# Patient Record
Sex: MALE | Race: WHITE | NOT HISPANIC OR LATINO | URBAN - METROPOLITAN AREA
[De-identification: names, ages, dates, MRNs, and addresses within clinical notes are randomized per-mention and may not be internally consistent; named-entity substitution may affect disease eponyms.]

---

## 2024-01-04 ENCOUNTER — OFFICE VISIT (OUTPATIENT)
Dept: OPHTHALMOLOGY | Facility: CLINIC | Age: 59
End: 2024-01-04
Payer: COMMERCIAL

## 2024-01-04 DIAGNOSIS — H40.1313 PIGMENTARY GLAUCOMA, RIGHT EYE, SEVERE STAGE: Primary | ICD-10-CM

## 2024-01-04 DIAGNOSIS — H40.1321 PIGMENTARY GLAUCOMA, LEFT EYE, MILD STAGE: ICD-10-CM

## 2024-01-04 DIAGNOSIS — H25.041 POSTERIOR SUBCAPSULAR POLAR SENILE CATARACT, RIGHT: ICD-10-CM

## 2024-01-04 DIAGNOSIS — H35.413 LATTICE DEGENERATION OF BOTH RETINAS: ICD-10-CM

## 2024-01-04 PROCEDURE — 92083 EXTENDED VISUAL FIELD XM: CPT | Performed by: OPHTHALMOLOGY

## 2024-01-04 PROCEDURE — 99213 OFFICE O/P EST LOW 20 MIN: CPT | Performed by: OPHTHALMOLOGY

## 2024-01-04 RX ORDER — VALACYCLOVIR HYDROCHLORIDE 1 G/1
1000 TABLET, FILM COATED ORAL 2 TIMES DAILY
COMMUNITY
Start: 2022-12-28

## 2024-01-04 RX ORDER — ACYCLOVIR 400 MG/1
TABLET ORAL
COMMUNITY
Start: 2015-12-19

## 2024-01-04 RX ORDER — BRIMONIDINE TARTRATE AND TIMOLOL MALEATE 2; 5 MG/ML; MG/ML
SOLUTION OPHTHALMIC EVERY 12 HOURS
COMMUNITY

## 2024-01-04 RX ORDER — FINASTERIDE 5 MG/1
5 TABLET, FILM COATED ORAL
COMMUNITY
Start: 2022-12-12

## 2024-01-04 RX ORDER — LATANOPROST 50 UG/ML
1 SOLUTION/ DROPS OPHTHALMIC
COMMUNITY
Start: 2016-04-21

## 2024-01-04 RX ORDER — ATORVASTATIN CALCIUM 40 MG/1
TABLET, FILM COATED ORAL
COMMUNITY
Start: 2018-02-24

## 2024-01-04 RX ORDER — SEMAGLUTIDE 1.7 MG/.75ML
INJECTION, SOLUTION SUBCUTANEOUS
COMMUNITY

## 2024-01-04 RX ORDER — GABAPENTIN 600 MG/1
TABLET ORAL
COMMUNITY
Start: 2018-02-24

## 2024-01-04 RX ORDER — LISINOPRIL 40 MG/1
40 TABLET ORAL
COMMUNITY
Start: 2023-06-12

## 2024-01-04 RX ORDER — TADALAFIL 20 MG/1
20 TABLET ORAL
COMMUNITY
Start: 2023-06-12

## 2024-01-04 RX ORDER — AMLODIPINE BESYLATE 10 MG/1
10 TABLET ORAL
COMMUNITY
Start: 2023-06-12

## 2024-01-04 RX ORDER — TIZANIDINE 4 MG/1
4 TABLET ORAL EVERY 8 HOURS PRN
COMMUNITY
Start: 2017-03-20

## 2024-01-04 RX ORDER — CARVEDILOL 25 MG/1
1 TABLET ORAL 2 TIMES DAILY
COMMUNITY
Start: 2021-08-27

## 2024-01-04 RX ORDER — ROSUVASTATIN CALCIUM 40 MG/1
1 TABLET, COATED ORAL NIGHTLY
COMMUNITY
Start: 2021-08-27

## 2024-01-04 RX ORDER — DISULFIRAM 500 MG/1
500 TABLET ORAL
COMMUNITY
Start: 2016-12-28

## 2024-01-04 RX ORDER — DULOXETIN HYDROCHLORIDE 30 MG/1
CAPSULE, DELAYED RELEASE ORAL
COMMUNITY
Start: 2016-09-22

## 2024-01-04 ASSESSMENT — PACHYMETRY
OS_CT(UM): 528
OD_CT(UM): 578

## 2024-01-04 ASSESSMENT — CONF VISUAL FIELD
OS_INFERIOR_NASAL_RESTRICTION: 0
OS_NORMAL: 1
METHOD: COUNTING FINGERS
OS_SUPERIOR_NASAL_RESTRICTION: 0
OS_SUPERIOR_TEMPORAL_RESTRICTION: 0
OS_INFERIOR_TEMPORAL_RESTRICTION: 0

## 2024-01-04 ASSESSMENT — EXTERNAL EXAM - RIGHT EYE: OD_EXAM: NORMAL

## 2024-01-04 ASSESSMENT — SLIT LAMP EXAM - LIDS
COMMENTS: GOOD POSITION
COMMENTS: GOOD POSITION

## 2024-01-04 ASSESSMENT — VISUAL ACUITY
OS_CC+: -1
OD_CC+: -1
OS_CC: 20/20
METHOD: SNELLEN - LINEAR
OD_CC: 20/25
CORRECTION_TYPE: CONTACTS

## 2024-01-04 ASSESSMENT — ENCOUNTER SYMPTOMS
EYES NEGATIVE: 1
NEUROLOGICAL NEGATIVE: 0
CONSTITUTIONAL NEGATIVE: 0

## 2024-01-04 ASSESSMENT — EXTERNAL EXAM - LEFT EYE: OS_EXAM: NORMAL

## 2024-01-04 ASSESSMENT — TONOMETRY
OD_IOP_MMHG: 12
IOP_METHOD: GOLDMANN APPLANATION
OS_IOP_MMHG: 9

## 2024-01-04 ASSESSMENT — CUP TO DISC RATIO
OD_RATIO: 0.95
OS_RATIO: .5

## 2024-01-04 NOTE — PROGRESS NOTES
Visual Acuity (Snellen - Linear)         Right Left    Dist cc 20/25 -1 20/20 -1      Correction: Contacts          Tonometry       Tonometry (Goldmann Applanation, 8:53 AM)         Right Left    Pressure 12 9                  Assessment/Plan   Last dilated:  6/22/23   RNFL 3/16/23 OS:  sup and inf loss; progression from 2016 ot 2021, but stable from 2021  1.  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 Pt has switched glaucoma medications that is covered by his insurance. s/p SLT OD 6/28/16 pre-laser IOP 15 mmHg -> + effect.  s/p repeat SLT OD 4/20/17   (pre-laser IOP 18 mmHg) -> + effect.         There may be some progression of RNFL OS but that could have been vitreous traction artifact.  VF is technically same as 2016, but not as good as previous.  Course unclear - given that IOP is already very low, will not advance Rx, but will increase surveillance.  VF Q6 months       Plan: cont latanoprost OU QHS              cont dorzolamide / timolol OU BID             cont brimonidine 0.2% OU BID             f/u 3 month              t/c repeat Q6 month VF (June 2024)      2.   H/o LASIK OS: pt`s [facility] is off.       Plan:  monitor     3.  Cataracts OU:  minimally visually significant.  Polar cataract right eye       Plan:  monitor    4.  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

## 2024-04-04 ENCOUNTER — APPOINTMENT (OUTPATIENT)
Dept: OPHTHALMOLOGY | Facility: CLINIC | Age: 59
End: 2024-04-04
Payer: COMMERCIAL

## 2024-04-11 ENCOUNTER — APPOINTMENT (OUTPATIENT)
Dept: OPHTHALMOLOGY | Facility: CLINIC | Age: 59
End: 2024-04-11
Payer: COMMERCIAL

## 2024-04-25 ENCOUNTER — OFFICE VISIT (OUTPATIENT)
Dept: OPHTHALMOLOGY | Facility: CLINIC | Age: 59
End: 2024-04-25
Payer: COMMERCIAL

## 2024-04-25 DIAGNOSIS — H40.1313 PIGMENTARY GLAUCOMA, RIGHT EYE, SEVERE STAGE: Primary | ICD-10-CM

## 2024-04-25 DIAGNOSIS — H40.1321 PIGMENTARY GLAUCOMA, LEFT EYE, MILD STAGE: ICD-10-CM

## 2024-04-25 PROCEDURE — 99213 OFFICE O/P EST LOW 20 MIN: CPT | Performed by: OPHTHALMOLOGY

## 2024-04-25 ASSESSMENT — SLIT LAMP EXAM - LIDS
COMMENTS: GOOD POSITION
COMMENTS: GOOD POSITION

## 2024-04-25 ASSESSMENT — PACHYMETRY
OS_CT(UM): 528
OD_CT(UM): 578

## 2024-04-25 ASSESSMENT — ENCOUNTER SYMPTOMS
GASTROINTESTINAL NEGATIVE: 0
ENDOCRINE NEGATIVE: 0
CONSTITUTIONAL NEGATIVE: 0
HEMATOLOGIC/LYMPHATIC NEGATIVE: 0
ALLERGIC/IMMUNOLOGIC NEGATIVE: 0
EYES NEGATIVE: 0
CARDIOVASCULAR NEGATIVE: 0
MUSCULOSKELETAL NEGATIVE: 0
PSYCHIATRIC NEGATIVE: 0
RESPIRATORY NEGATIVE: 0
NEUROLOGICAL NEGATIVE: 0

## 2024-04-25 ASSESSMENT — VISUAL ACUITY
OS_CC: 20/30
CORRECTION_TYPE: CONTACTS
OD_PH_CC: 20/20
OS_CC+: -1
METHOD: SNELLEN - LINEAR
OD_CC+: -1
OD_PH_CC+: -2
OD_CC: 20/30
OS_PH_CC: 20/25

## 2024-04-25 ASSESSMENT — CONF VISUAL FIELD: OD_SUPERIOR_NASAL_RESTRICTION: 1

## 2024-04-25 ASSESSMENT — TONOMETRY
OS_IOP_MMHG: 10
OD_IOP_MMHG: 12
IOP_METHOD: GOLDMANN APPLANATION

## 2024-04-25 ASSESSMENT — CUP TO DISC RATIO
OD_RATIO: 0.95
OS_RATIO: .6

## 2024-04-25 ASSESSMENT — EXTERNAL EXAM - RIGHT EYE: OD_EXAM: NORMAL

## 2024-04-25 ASSESSMENT — EXTERNAL EXAM - LEFT EYE: OS_EXAM: NORMAL

## 2024-04-25 NOTE — PROGRESS NOTES
Visual Acuity (Snellen - Linear)         Right Left    Dist cc 20/30 -1 20/30 -1    Dist ph cc 20/20 -2 20/25      Correction: Contacts          Tonometry       Tonometry (Goldmann Applanation, 8:33 AM)         Right Left    Pressure 12 10                  Assessment/Plan   Last dilated:  6/22/23   RNFL 3/16/23 OS:  sup and inf loss; progression from 2016 ot 2021, but stable from 2021  1.  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 Pt has switched glaucoma medications that is covered by his insurance. s/p SLT OD 6/28/16 pre-laser IOP 15 mmHg -> + effect.  s/p repeat SLT OD 4/20/17   (pre-laser IOP 18 mmHg) -> + effect.         There may be some progression of RNFL OS but that could have been vitreous traction artifact.  VF is technically same as 2016, but not as good as previous.  Course unclear - given that IOP is already very low, will not advance Rx, but will increase surveillance.  VF Q6 months       Plan: cont latanoprost OU QHS              cont dorzolamide / timolol OU BID             cont brimonidine 0.2% OU BID             f/u 2 month              t/c repeat Q6 month VF (June 2024)      2.   H/o LASIK OS: pt`s [facility] is off.       Plan:  monitor     3.  Cataracts OU:  minimally visually significant.  Polar cataract right eye       Plan:  monitor    4.  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

## 2024-06-25 ENCOUNTER — APPOINTMENT (OUTPATIENT)
Dept: OPHTHALMOLOGY | Facility: CLINIC | Age: 59
End: 2024-06-25
Payer: COMMERCIAL

## 2024-07-10 DIAGNOSIS — H40.1313 PIGMENTARY GLAUCOMA, RIGHT EYE, SEVERE STAGE: ICD-10-CM

## 2024-07-10 DIAGNOSIS — H40.1321 PIGMENTARY GLAUCOMA, LEFT EYE, MILD STAGE: ICD-10-CM

## 2024-07-10 RX ORDER — LATANOPROST 50 UG/ML
1 SOLUTION/ DROPS OPHTHALMIC NIGHTLY
Qty: 2.5 ML | Refills: 3 | Status: SHIPPED | OUTPATIENT
Start: 2024-07-10

## 2024-07-15 DIAGNOSIS — H40.1313 PIGMENTARY GLAUCOMA, RIGHT EYE, SEVERE STAGE: ICD-10-CM

## 2024-07-15 DIAGNOSIS — H40.1321 PIGMENTARY GLAUCOMA, LEFT EYE, MILD STAGE: ICD-10-CM

## 2024-07-15 RX ORDER — LATANOPROST 50 UG/ML
1 SOLUTION/ DROPS OPHTHALMIC NIGHTLY
Qty: 2.5 ML | Refills: 3 | Status: SHIPPED | OUTPATIENT
Start: 2024-07-15

## 2024-07-18 DIAGNOSIS — H40.1313 PIGMENTARY GLAUCOMA, RIGHT EYE, SEVERE STAGE: ICD-10-CM

## 2024-07-18 DIAGNOSIS — H40.1321 PIGMENTARY GLAUCOMA, LEFT EYE, MILD STAGE: ICD-10-CM

## 2024-07-18 RX ORDER — BRIMONIDINE TARTRATE AND TIMOLOL MALEATE 2; 5 MG/ML; MG/ML
1 SOLUTION OPHTHALMIC EVERY 12 HOURS
Qty: 7.5 ML | Refills: 3 | Status: SHIPPED | OUTPATIENT
Start: 2024-07-18 | End: 2024-08-17

## 2024-08-20 DIAGNOSIS — H40.1321 PIGMENTARY GLAUCOMA, LEFT EYE, MILD STAGE: ICD-10-CM

## 2024-08-20 DIAGNOSIS — H40.1313 PIGMENTARY GLAUCOMA, RIGHT EYE, SEVERE STAGE: ICD-10-CM

## 2024-08-20 RX ORDER — DORZOLAMIDE HYDROCHLORIDE AND TIMOLOL MALEATE 20; 5 MG/ML; MG/ML
1 SOLUTION/ DROPS OPHTHALMIC 2 TIMES DAILY
Qty: 30 ML | Refills: 3 | Status: SHIPPED | OUTPATIENT
Start: 2024-08-20 | End: 2025-08-20

## 2024-08-20 RX ORDER — BRIMONIDINE TARTRATE 2 MG/ML
1 SOLUTION/ DROPS OPHTHALMIC 2 TIMES DAILY
Qty: 30 ML | Refills: 3 | Status: SHIPPED | OUTPATIENT
Start: 2024-08-20 | End: 2025-08-20

## 2024-08-20 RX ORDER — LATANOPROST 50 UG/ML
1 SOLUTION/ DROPS OPHTHALMIC NIGHTLY
Qty: 2.5 ML | Refills: 3 | Status: SHIPPED | OUTPATIENT
Start: 2024-08-20

## 2024-08-22 ENCOUNTER — APPOINTMENT (OUTPATIENT)
Dept: OPHTHALMOLOGY | Facility: CLINIC | Age: 59
End: 2024-08-22
Payer: COMMERCIAL

## 2024-08-29 ENCOUNTER — APPOINTMENT (OUTPATIENT)
Dept: OPHTHALMOLOGY | Facility: CLINIC | Age: 59
End: 2024-08-29
Payer: COMMERCIAL

## 2024-08-29 DIAGNOSIS — H40.1321 PIGMENTARY GLAUCOMA, LEFT EYE, MILD STAGE: ICD-10-CM

## 2024-08-29 DIAGNOSIS — H25.041 POSTERIOR SUBCAPSULAR POLAR SENILE CATARACT, RIGHT: Primary | ICD-10-CM

## 2024-08-29 DIAGNOSIS — H40.1313 PIGMENTARY GLAUCOMA, RIGHT EYE, SEVERE STAGE: ICD-10-CM

## 2024-08-29 DIAGNOSIS — H35.413 LATTICE DEGENERATION OF BOTH RETINAS: ICD-10-CM

## 2024-08-29 PROCEDURE — 99214 OFFICE O/P EST MOD 30 MIN: CPT | Performed by: OPHTHALMOLOGY

## 2024-08-29 RX ORDER — DORZOLAMIDE HYDROCHLORIDE AND TIMOLOL MALEATE 20; 5 MG/ML; MG/ML
1 SOLUTION/ DROPS OPHTHALMIC 2 TIMES DAILY
Qty: 30 ML | Refills: 3 | Status: SHIPPED | OUTPATIENT
Start: 2024-08-29 | End: 2025-08-29

## 2024-08-29 RX ORDER — LATANOPROST 50 UG/ML
1 SOLUTION/ DROPS OPHTHALMIC NIGHTLY
Qty: 7.5 ML | Refills: 3 | Status: SHIPPED | OUTPATIENT
Start: 2024-08-29

## 2024-08-29 RX ORDER — BRIMONIDINE TARTRATE 2 MG/ML
1 SOLUTION/ DROPS OPHTHALMIC 2 TIMES DAILY
Qty: 30 ML | Refills: 3 | Status: SHIPPED | OUTPATIENT
Start: 2024-08-29 | End: 2025-08-29

## 2024-08-29 ASSESSMENT — CUP TO DISC RATIO
OD_RATIO: 0.95
OS_RATIO: .6

## 2024-08-29 ASSESSMENT — SLIT LAMP EXAM - LIDS
COMMENTS: GOOD POSITION
COMMENTS: GOOD POSITION

## 2024-08-29 ASSESSMENT — PACHYMETRY
OS_CT(UM): 528
OD_CT(UM): 578

## 2024-08-29 ASSESSMENT — CONF VISUAL FIELD
OS_SUPERIOR_TEMPORAL_RESTRICTION: 0
OD_SUPERIOR_NASAL_RESTRICTION: 1
OD_INFERIOR_NASAL_RESTRICTION: 1
OS_SUPERIOR_NASAL_RESTRICTION: 0
OS_INFERIOR_NASAL_RESTRICTION: 0
OD_SUPERIOR_TEMPORAL_RESTRICTION: 3

## 2024-08-29 ASSESSMENT — EXTERNAL EXAM - RIGHT EYE: OD_EXAM: NORMAL

## 2024-08-29 ASSESSMENT — TONOMETRY
OS_IOP_MMHG: 11
OD_IOP_MMHG: 18
IOP_METHOD: GOLDMANN APPLANATION

## 2024-08-29 ASSESSMENT — VISUAL ACUITY
OS_CC+: +2
METHOD: SNELLEN - LINEAR
OS_CC: 20/25
OD_CC: 20/20
OD_CC+: -2

## 2024-08-29 ASSESSMENT — EXTERNAL EXAM - LEFT EYE: OS_EXAM: NORMAL

## 2024-08-29 NOTE — PROGRESS NOTES
Visual Acuity (Snellen - Linear)         Right Left    Dist cc 20/20 -2 20/25 +2    Dist ph cc  NI          Tonometry       Tonometry (Goldmann Applanation, 8:30 AM)         Right Left    Pressure 18 11                  Assessment/Plan   Last dilated:  6/22/23  Last HVF:  1/4/24    RNFL 3/16/23 OS:  sup and inf loss; progression from 2016 ot 2021, but stable from 2021    1.  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 Pt has switched glaucoma medications that is covered by his insurance. s/p SLT OD 6/28/16 pre-laser IOP 15 mmHg -> + effect.  s/p repeat SLT OD 4/20/17 (pre-laser IOP 18 mmHg) -> + effect.         There may be some progression of RNFL OS but that could have been vitreous traction artifact.  VF is technically same as 2016, but not as good as previous.  Course unclear - given that IOP is already very low, will not advance Rx, but will increase surveillance.  VF Q6 months.        IOP is not controlled OD.  Discussed options 1) CPM, 2) rhopressa, 3) repeat/repeat SLT OD.  R/B/A        Plan: cont latanoprost OU QHS              cont dorzolamide / timolol OU BID             cont brimonidine 0.2% OU BID             pt wishes to pursue SLT OD (RIGHT)     2.   H/o LASIK OS: pt`s [facility] is off.       Plan:  monitor     3.  Cataracts OU:  minimally visually significant.  Polar cataract right eye       Plan:  monitor    4.  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

## 2024-09-27 ENCOUNTER — TELEPHONE (OUTPATIENT)
Dept: OPHTHALMOLOGY | Facility: CLINIC | Age: 59
End: 2024-09-27
Payer: COMMERCIAL

## 2024-09-27 NOTE — TELEPHONE ENCOUNTER
Patient is scheduled for pressure check with Dr. Angel on 10/1 @ 2:45pm  Patient does not want to schedule a Laser Procedure at this time only wants a pressure check.

## 2024-09-30 ENCOUNTER — PATIENT MESSAGE (OUTPATIENT)
Dept: OPHTHALMOLOGY | Facility: CLINIC | Age: 59
End: 2024-09-30
Payer: COMMERCIAL

## 2024-10-01 ENCOUNTER — APPOINTMENT (OUTPATIENT)
Dept: OPHTHALMOLOGY | Facility: CLINIC | Age: 59
End: 2024-10-01
Payer: COMMERCIAL

## 2024-10-01 DIAGNOSIS — H40.1313 PIGMENTARY GLAUCOMA, RIGHT EYE, SEVERE STAGE: ICD-10-CM

## 2024-10-01 DIAGNOSIS — H40.2234 BILATERAL CHRONIC PRIMARY ANGLE-CLOSURE GLAUCOMA, INDETERMINATE STAGE: Primary | ICD-10-CM

## 2024-10-01 DIAGNOSIS — H40.1321 PIGMENTARY GLAUCOMA, LEFT EYE, MILD STAGE: ICD-10-CM

## 2024-10-01 PROCEDURE — 99213 OFFICE O/P EST LOW 20 MIN: CPT | Performed by: OPHTHALMOLOGY

## 2024-10-01 RX ORDER — BRIMONIDINE TARTRATE 2 MG/ML
1 SOLUTION/ DROPS OPHTHALMIC 2 TIMES DAILY
Qty: 30 ML | Refills: 3 | Status: SHIPPED | OUTPATIENT
Start: 2024-10-01 | End: 2025-10-01

## 2024-10-01 RX ORDER — LATANOPROST 50 UG/ML
1 SOLUTION/ DROPS OPHTHALMIC NIGHTLY
Qty: 7.5 ML | Refills: 3 | Status: SHIPPED | OUTPATIENT
Start: 2024-10-01

## 2024-10-01 RX ORDER — DORZOLAMIDE HYDROCHLORIDE AND TIMOLOL MALEATE 20; 5 MG/ML; MG/ML
1 SOLUTION/ DROPS OPHTHALMIC 2 TIMES DAILY
Qty: 30 ML | Refills: 3 | Status: SHIPPED | OUTPATIENT
Start: 2024-10-01 | End: 2025-10-01

## 2024-10-01 ASSESSMENT — PACHYMETRY
OS_CT(UM): 528
OD_CT(UM): 578

## 2024-10-01 ASSESSMENT — VISUAL ACUITY
OD_CC: 20/30
OS_CC: 20/50
CORRECTION_TYPE: CONTACTS
OS_PH_CC: 20/25
OS_PH_CC+: -2
METHOD: SNELLEN - LINEAR

## 2024-10-01 ASSESSMENT — SLIT LAMP EXAM - LIDS
COMMENTS: GOOD POSITION
COMMENTS: GOOD POSITION

## 2024-10-01 ASSESSMENT — EXTERNAL EXAM - LEFT EYE: OS_EXAM: NORMAL

## 2024-10-01 ASSESSMENT — CUP TO DISC RATIO
OS_RATIO: .6
OD_RATIO: 0.95

## 2024-10-01 ASSESSMENT — TONOMETRY
OD_IOP_MMHG: 17
OS_IOP_MMHG: 14
IOP_METHOD: GOLDMANN APPLANATION

## 2024-10-01 ASSESSMENT — EXTERNAL EXAM - RIGHT EYE: OD_EXAM: NORMAL

## 2024-10-01 ASSESSMENT — ENCOUNTER SYMPTOMS: EYES NEGATIVE: 1

## 2024-10-01 NOTE — PROGRESS NOTES
1.  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 Pt has switched glaucoma medications that is covered by his insurance. s/p SLT OD 6/28/16 pre-laser IOP 15 mmHg -> + effect.  s/p repeat SLT OD 4/20/17 (pre-laser IOP 18 mmHg) -> + effect.         There may be some progression of RNFL OS but that could have been vitreous traction artifact.  VF is technically same as 2016, but not as good as previous.  Course unclear - given that IOP is already very low, will not advance Rx, but will increase surveillance.  VF Q6 months.        IOP is not controlled OD.  Discussed options 1) CPM, 2) rhopressa, 3) repeat/repeat SLT OD.  R/B/A        Plan: cont latanoprost OU QHS              cont dorzolamide / timolol OU BID             cont brimonidine 0.2% OU BID             pt wishes to pursue SLT OD (RIGHT)   Here today for intraocular pressure (IOP) check for Dr. Will    Intraocular pressure (IOP) on GAT 17 OD, 14 OS   - patient instructed to continue drops as directed by Dr. Will        NOT ADDRESSED   2.   H/o LASIK OS: pt`s [facility] is off.       Plan:  monitor     3.  Cataracts OU:  minimally visually significant.  Polar cataract right eye       Plan:  monitor    4.  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

## 2024-10-07 ENCOUNTER — APPOINTMENT (OUTPATIENT)
Dept: OPHTHALMOLOGY | Facility: CLINIC | Age: 59
End: 2024-10-07
Payer: COMMERCIAL

## 2024-10-14 DIAGNOSIS — H40.1321 PIGMENTARY GLAUCOMA, LEFT EYE, MILD STAGE: ICD-10-CM

## 2024-10-14 DIAGNOSIS — H40.1313 PIGMENTARY GLAUCOMA, RIGHT EYE, SEVERE STAGE: ICD-10-CM

## 2024-10-14 RX ORDER — DORZOLAMIDE HYDROCHLORIDE AND TIMOLOL MALEATE 20; 5 MG/ML; MG/ML
1 SOLUTION/ DROPS OPHTHALMIC 2 TIMES DAILY
Qty: 30 ML | Refills: 3 | Status: SHIPPED | OUTPATIENT
Start: 2024-10-14 | End: 2025-10-14

## 2024-11-12 ENCOUNTER — TELEPHONE (OUTPATIENT)
Dept: OPHTHALMOLOGY | Facility: CLINIC | Age: 59
End: 2024-11-12
Payer: COMMERCIAL

## 2024-12-10 DIAGNOSIS — H40.1313 PIGMENTARY GLAUCOMA, RIGHT EYE, SEVERE STAGE: ICD-10-CM

## 2024-12-10 DIAGNOSIS — H40.1321 PIGMENTARY GLAUCOMA, LEFT EYE, MILD STAGE: ICD-10-CM

## 2024-12-10 RX ORDER — DORZOLAMIDE HYDROCHLORIDE AND TIMOLOL MALEATE 20; 5 MG/ML; MG/ML
1 SOLUTION/ DROPS OPHTHALMIC 2 TIMES DAILY
Qty: 30 ML | Refills: 3 | Status: SHIPPED | OUTPATIENT
Start: 2024-12-10 | End: 2025-12-10

## 2024-12-10 RX ORDER — BRIMONIDINE TARTRATE 2 MG/ML
1 SOLUTION/ DROPS OPHTHALMIC 2 TIMES DAILY
Qty: 30 ML | Refills: 3 | Status: SHIPPED | OUTPATIENT
Start: 2024-12-10 | End: 2025-12-10

## 2024-12-12 RX ORDER — LATANOPROST 50 UG/ML
1 SOLUTION/ DROPS OPHTHALMIC NIGHTLY
Qty: 7.5 ML | Refills: 3 | Status: SHIPPED | OUTPATIENT
Start: 2024-12-12 | End: 2024-12-16 | Stop reason: SDUPTHER

## 2024-12-16 ENCOUNTER — APPOINTMENT (OUTPATIENT)
Dept: OPHTHALMOLOGY | Facility: CLINIC | Age: 59
End: 2024-12-16
Payer: COMMERCIAL

## 2024-12-16 ENCOUNTER — OFFICE VISIT (OUTPATIENT)
Dept: OPHTHALMOLOGY | Facility: CLINIC | Age: 59
End: 2024-12-16
Payer: COMMERCIAL

## 2024-12-16 DIAGNOSIS — H40.1321 PIGMENTARY GLAUCOMA, LEFT EYE, MILD STAGE: ICD-10-CM

## 2024-12-16 DIAGNOSIS — H40.1313 PIGMENTARY GLAUCOMA, RIGHT EYE, SEVERE STAGE: Primary | ICD-10-CM

## 2024-12-16 PROCEDURE — 65855 TRABECULOPLASTY LASER SURG: CPT | Mod: RIGHT SIDE | Performed by: OPHTHALMOLOGY

## 2024-12-16 RX ORDER — LATANOPROST 50 UG/ML
1 SOLUTION/ DROPS OPHTHALMIC NIGHTLY
Qty: 7.5 ML | Refills: 3 | Status: SHIPPED | OUTPATIENT
Start: 2024-12-16 | End: 2025-12-16

## 2024-12-16 RX ORDER — PREDNISOLONE ACETATE 10 MG/ML
1 SUSPENSION/ DROPS OPHTHALMIC 4 TIMES DAILY
Qty: 5 ML | Refills: 0 | Status: SHIPPED | OUTPATIENT
Start: 2024-12-16 | End: 2024-12-20

## 2024-12-16 NOTE — PROGRESS NOTES
Assessment/Plan   Last dilated:  6/22/23  Last HVF:  1/4/24    RNFL 3/16/23 OS:  sup and inf loss; progression from 2016 ot 2021, but stable from 2021    1.  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 Pt has switched glaucoma medications that is covered by his insurance. s/p SLT OD 6/28/16 pre-laser IOP 15 mmHg -> + effect.  s/p repeat SLT OD 4/20/17 (pre-laser IOP 18 mmHg) -> + effect.       There may be some progression of RNFL OS but that could have been vitreous traction artifact.  VF is technically same as 2016, but not as good as previous.  Course unclear - given that IOP is already very low, will not advance Rx, but will increase surveillance.  VF Q6 months.        IOP is not controlled OD.  Previously discussed options 1) CPM, 2) rhopressa, 3) repeat/repeat SLT OD.  R/B/A reviewed.  Today, also discussed my continued recommendation for SLT as IOP is not well controlled.  Also discussed relative utility diurnal IOP measurements       Plan: cont latanoprost OU QHS              cont dorzolamide / timolol OU BID             cont brimonidine 0.2% OU BID             pt wishes to proceed with SLT OD (RIGHT) 12/16/24, pre-laser = 18 mmHg     2.   H/o LASIK OS: pt`s [facility] is off.       Plan:  monitor     3.  Cataracts OU:  minimally visually significant.  Polar cataract right eye       Plan:  monitor    4.  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

## 2025-01-02 ENCOUNTER — APPOINTMENT (OUTPATIENT)
Dept: OPHTHALMOLOGY | Facility: CLINIC | Age: 60
End: 2025-01-02
Payer: COMMERCIAL

## 2025-01-02 DIAGNOSIS — H40.1321 PIGMENTARY GLAUCOMA, LEFT EYE, MILD STAGE: ICD-10-CM

## 2025-01-02 DIAGNOSIS — H40.1313 PIGMENTARY GLAUCOMA, RIGHT EYE, SEVERE STAGE: Primary | ICD-10-CM

## 2025-01-02 PROCEDURE — 99213 OFFICE O/P EST LOW 20 MIN: CPT | Performed by: OPHTHALMOLOGY

## 2025-01-02 ASSESSMENT — CUP TO DISC RATIO
OD_RATIO: 0.95
OS_RATIO: .6

## 2025-01-02 ASSESSMENT — SLIT LAMP EXAM - LIDS
COMMENTS: GOOD POSITION
COMMENTS: GOOD POSITION

## 2025-01-02 ASSESSMENT — CONF VISUAL FIELD
OS_SUPERIOR_NASAL_RESTRICTION: 0
OD_SUPERIOR_NASAL_RESTRICTION: 0
OS_INFERIOR_TEMPORAL_RESTRICTION: 0
OS_NORMAL: 1
OD_SUPERIOR_TEMPORAL_RESTRICTION: 0
OD_INFERIOR_NASAL_RESTRICTION: 0
OD_INFERIOR_TEMPORAL_RESTRICTION: 0
OD_NORMAL: 1
OS_INFERIOR_NASAL_RESTRICTION: 0
OS_SUPERIOR_TEMPORAL_RESTRICTION: 0

## 2025-01-02 ASSESSMENT — VISUAL ACUITY
OD_CC+: +2
OD_CC: 20/25
METHOD: SNELLEN - LINEAR

## 2025-01-02 ASSESSMENT — TONOMETRY
OD_IOP_MMHG: 14
IOP_METHOD: GOLDMANN APPLANATION

## 2025-01-02 ASSESSMENT — EXTERNAL EXAM - RIGHT EYE: OD_EXAM: NORMAL

## 2025-01-02 ASSESSMENT — PACHYMETRY
OD_CT(UM): 578
OS_CT(UM): 528

## 2025-01-02 ASSESSMENT — EXTERNAL EXAM - LEFT EYE: OS_EXAM: NORMAL

## 2025-01-02 NOTE — PROGRESS NOTES
Visual Acuity (Snellen - Linear)         Right Left    Dist cc 20/25 +2           Tonometry       Tonometry (Goldmann Applanation, 8:56 AM)         Right Left    Pressure 14                   Assessment/Plan   Last dilated:  6/22/23  Last HVF:  1/4/24    RNFL 3/16/23 OS:  sup and inf loss; progression from 2016 ot 2021, but stable from 2021    1.  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 Pt has switched glaucoma medications that is covered by his insurance. s/p SLT OD 6/28/16 pre-laser IOP 15 mmHg -> + effect.  s/p repeat SLT OD 4/20/17 (pre-laser IOP 18 mmHg) -> + effect.       There may be some progression of RNFL OS but that could have been vitreous traction artifact.  VF is technically same as 2016, but not as good as previous.  Course unclear - given that IOP is already very low, will not advance Rx, but will increase surveillance.  VF Q6 months.        IOP was not controlled OD.  s/p SLT OD (RIGHT) 12/16/24, pre-laser = 18 mmHg -> 4 mmHg early effect and now IOP is well controlled       Plan: cont latanoprost OU QHS              cont dorzolamide / timolol OU BID             cont brimonidine 0.2% OU BID             f/u 6 weeks  to see full effect of SLT    2.   H/o LASIK OS: pt`s [facility] is off.       Plan:  monitor     3.  Cataracts OU:  minimally visually significant.  Polar cataract right eye       Plan:  monitor    4.  Flashes OS:  pt noting occassional flash daily for the last month,ie. Nov 2024.  Pt was seen urgently and then subsequently non-urgently with Dr. Moffett at [facility] 11/27/24        Plan:  consult Dr. Proctor 2 months    4.  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

## 2025-01-23 ENCOUNTER — APPOINTMENT (OUTPATIENT)
Dept: OPHTHALMOLOGY | Facility: CLINIC | Age: 60
End: 2025-01-23
Payer: COMMERCIAL

## 2025-01-23 DIAGNOSIS — H40.1321 PIGMENTARY GLAUCOMA, LEFT EYE, MILD STAGE: ICD-10-CM

## 2025-01-23 DIAGNOSIS — H35.413 LATTICE DEGENERATION OF BOTH RETINAS: ICD-10-CM

## 2025-01-23 DIAGNOSIS — H40.1313 PIGMENTARY GLAUCOMA, RIGHT EYE, SEVERE STAGE: Primary | ICD-10-CM

## 2025-01-23 DIAGNOSIS — H25.041 POSTERIOR SUBCAPSULAR POLAR SENILE CATARACT, RIGHT: ICD-10-CM

## 2025-01-23 PROCEDURE — 92083 EXTENDED VISUAL FIELD XM: CPT | Performed by: OPHTHALMOLOGY

## 2025-01-23 PROCEDURE — 99214 OFFICE O/P EST MOD 30 MIN: CPT | Performed by: OPHTHALMOLOGY

## 2025-01-23 PROCEDURE — 92020 GONIOSCOPY: CPT | Performed by: OPHTHALMOLOGY

## 2025-01-23 RX ORDER — BRIMONIDINE TARTRATE 2 MG/ML
1 SOLUTION/ DROPS OPHTHALMIC 2 TIMES DAILY
Qty: 30 ML | Refills: 3 | Status: SHIPPED | OUTPATIENT
Start: 2025-01-23 | End: 2026-01-23

## 2025-01-23 RX ORDER — LATANOPROST 50 UG/ML
1 SOLUTION/ DROPS OPHTHALMIC NIGHTLY
Qty: 7.5 ML | Refills: 3 | Status: SHIPPED | OUTPATIENT
Start: 2025-01-23 | End: 2026-01-23

## 2025-01-23 RX ORDER — DORZOLAMIDE HYDROCHLORIDE AND TIMOLOL MALEATE 20; 5 MG/ML; MG/ML
1 SOLUTION/ DROPS OPHTHALMIC 2 TIMES DAILY
Qty: 30 ML | Refills: 3 | Status: SHIPPED | OUTPATIENT
Start: 2025-01-23 | End: 2026-01-23

## 2025-01-23 ASSESSMENT — EXTERNAL EXAM - LEFT EYE: OS_EXAM: NORMAL

## 2025-01-23 ASSESSMENT — SLIT LAMP EXAM - LIDS
COMMENTS: GOOD POSITION
COMMENTS: GOOD POSITION

## 2025-01-23 ASSESSMENT — PACHYMETRY
OS_CT(UM): 528
OD_CT(UM): 578

## 2025-01-23 ASSESSMENT — CUP TO DISC RATIO
OS_RATIO: .6
OD_RATIO: 0.95

## 2025-01-23 ASSESSMENT — GONIOSCOPY
OD_INFERIOR: D40R 1-2+ PTM
OS_SUPERIOR: D40R 1-2+ PTM
OS_INFERIOR: D40R 1-2+ PTM
OD_TEMPORAL: D40R 1-2+ PTM
OD_SUPERIOR: D40R 1-2+ PTM
OS_NASAL: D40R 1-2+ PTM
OS_TEMPORAL: D40R 1-2+ PTM
OD_NASAL: D40R 1-2+ PTM

## 2025-01-23 ASSESSMENT — VISUAL ACUITY
METHOD: SNELLEN - LINEAR
OD_CC: 20/25
OD_CC+: +2
OS_CC+: +/-2
OS_CC: 20/25

## 2025-01-23 ASSESSMENT — TONOMETRY
IOP_METHOD: GOLDMANN APPLANATION
OD_IOP_MMHG: 15
OS_IOP_MMHG: 12

## 2025-01-23 ASSESSMENT — CONF VISUAL FIELD
OD_SUPERIOR_TEMPORAL_RESTRICTION: 1
OD_SUPERIOR_NASAL_RESTRICTION: 1

## 2025-01-23 ASSESSMENT — EXTERNAL EXAM - RIGHT EYE: OD_EXAM: NORMAL

## 2025-01-23 NOTE — PROGRESS NOTES
Visual Acuity (Snellen - Linear)         Right Left    Dist cc 20/25 +2 20/25 +/-2          Tonometry       Tonometry (Goldmann Applanation, 8:21 AM)         Right Left    Pressure 15 12                  Assessment/Plan   Last dilated:  1/23/25  Last gonio OD:  D40r 1-2+ PTM    OS:  D40r 1-2+ PTM    RNFL 3/16/23 OS:  sup and inf loss; progression from 2016 ot 2021, but stable from 2021    1.  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 Pt has switched glaucoma medications that is covered by his insurance. s/p SLT OD 6/28/16 pre-laser IOP 15 mmHg -> + effect.  s/p repeat SLT OD 4/20/17 (pre-laser IOP 18 mmHg) -> + effect.       There may be some progression of RNFL OS but that could have been vitreous traction artifact.  VF is technically same as 2016, but not as good as previous.  Course unclear - given that IOP is already very low, will not advance Rx, but will increase surveillance.  VF Q6 months.        s/p SLT OD (RIGHT) 12/16/24, pre-laser = 18 mmHg -> 3 mmHg effect and now IOP is well controlled       Plan: cont latanoprost OU QHS              cont dorzolamide / timolol OU BID             cont brimonidine 0.2% OU BID             f/u 3 months (RNFL)    2.   H/o LASIK OS: pt`s [facility] is off.       Plan:  monitor     3.  Post Polar Cataracts OU:  minimally visually significant.  Polar cataract both eyes       Plan:  monitor    4.  Flashes OS:  pt noting occassional flash daily for the last month,ie. Nov 2024.  Pt was seen urgently and then subsequently non-urgently with Dr. Moffett at [facility] 11/27/24        Plan:  as per Dr. Proctor     4.  Lattice Degeneration OU:  no holes       Plan: MAURICIOW reviewed

## 2025-02-13 ENCOUNTER — APPOINTMENT (OUTPATIENT)
Dept: OPHTHALMOLOGY | Facility: CLINIC | Age: 60
End: 2025-02-13
Payer: COMMERCIAL

## 2025-02-13 DIAGNOSIS — H40.1321 PIGMENTARY GLAUCOMA, LEFT EYE, MILD STAGE: ICD-10-CM

## 2025-02-13 DIAGNOSIS — H40.1313 PIGMENTARY GLAUCOMA, RIGHT EYE, SEVERE STAGE: Primary | ICD-10-CM

## 2025-02-13 PROCEDURE — 92133 CPTRZD OPH DX IMG PST SGM ON: CPT | Performed by: OPHTHALMOLOGY

## 2025-02-13 PROCEDURE — 99213 OFFICE O/P EST LOW 20 MIN: CPT | Performed by: OPHTHALMOLOGY

## 2025-02-13 RX ORDER — NETARSUDIL 0.2 MG/ML
1 SOLUTION/ DROPS OPHTHALMIC; TOPICAL NIGHTLY
Qty: 2.5 ML | Refills: 11 | Status: SHIPPED | OUTPATIENT
Start: 2025-02-13 | End: 2026-02-13

## 2025-02-13 ASSESSMENT — CUP TO DISC RATIO
OD_RATIO: 0.95
OS_RATIO: .6

## 2025-02-13 ASSESSMENT — ENCOUNTER SYMPTOMS: EYES NEGATIVE: 1

## 2025-02-13 ASSESSMENT — EXTERNAL EXAM - RIGHT EYE: OD_EXAM: NORMAL

## 2025-02-13 ASSESSMENT — VISUAL ACUITY
CORRECTION_TYPE: CONTACTS
METHOD: SNELLEN - LINEAR
OS_CC: 20/20
OD_CC: 20/30
OS_CC+: -2

## 2025-02-13 ASSESSMENT — EXTERNAL EXAM - LEFT EYE: OS_EXAM: NORMAL

## 2025-02-13 ASSESSMENT — PACHYMETRY
OD_CT(UM): 578
OS_CT(UM): 528

## 2025-02-13 ASSESSMENT — TONOMETRY
OS_IOP_MMHG: 15
IOP_METHOD: GOLDMANN APPLANATION
OD_IOP_MMHG: 16

## 2025-02-13 ASSESSMENT — SLIT LAMP EXAM - LIDS
COMMENTS: GOOD POSITION
COMMENTS: GOOD POSITION

## 2025-02-13 NOTE — PROGRESS NOTES
Visual Acuity (Snellen - Linear)         Right Left    Dist cc 20/30 20/20 -2      Correction: Contacts          Tonometry       Tonometry (Goldmann Applanation, 9:22 AM)         Right Left    Pressure 16 15                  Assessment/Plan   Last dilated:  1/23/25  Last gonio OD:  D40r 1-2+ PTM    OS:  D40r 1-2+ PTM    1.  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 Pt has switched glaucoma medications that is covered by his insurance. s/p SLT OD 6/28/16 pre-laser IOP 15 mmHg -> + effect.  s/p repeat SLT OD 4/20/17 (pre-laser IOP 18 mmHg) -> + effect.       There may be some progression of RNFL OS but that could have been vitreous traction artifact.  VF is technically same as 2016, but not as good as previous.  Course unclear - given that IOP is already very low, will not advance Rx, but will increase surveillance.  VF Q6 months.        s/p SLT OD (RIGHT) 12/16/24, pre-laser = 18 mmHg -> 3 mmHg effect, but IOP has risen again - minimal change in RNFL despite period of poorer IOP control.       Plan: cont latanoprost OU QHS              cont dorzolamide / timolol OU BID             cont brimonidine 0.2% OU BID             Start rhopressa OD QHS             f/u 1 months     2.   H/o LASIK OS: pt`s [facility] is off.       Plan:  monitor     3.  Post Polar Cataracts OU:  minimally visually significant.  Polar cataract both eyes       Plan:  monitor    4.  Flashes OS:  pt noting occassional flash daily for the last month,ie. Nov 2024.  Pt was seen urgently and then subsequently non-urgently with Dr. Moffett at [facility] 11/27/24        Plan:  as per Dr. Proctor     4.  Lattice Degeneration OU:  no holes       Plan: MAURICIOW reviewed

## 2025-03-20 ENCOUNTER — APPOINTMENT (OUTPATIENT)
Dept: OPHTHALMOLOGY | Facility: CLINIC | Age: 60
End: 2025-03-20
Payer: COMMERCIAL

## 2025-03-20 DIAGNOSIS — H40.1313 PIGMENTARY GLAUCOMA, RIGHT EYE, SEVERE STAGE: Primary | ICD-10-CM

## 2025-03-20 DIAGNOSIS — H40.1321 PIGMENTARY GLAUCOMA, LEFT EYE, MILD STAGE: ICD-10-CM

## 2025-03-20 DIAGNOSIS — H25.041 POSTERIOR SUBCAPSULAR POLAR SENILE CATARACT, RIGHT: ICD-10-CM

## 2025-03-20 PROCEDURE — 99213 OFFICE O/P EST LOW 20 MIN: CPT | Performed by: OPHTHALMOLOGY

## 2025-03-20 ASSESSMENT — TONOMETRY
IOP_METHOD: GOLDMANN APPLANATION
OS_IOP_MMHG: 14
OD_IOP_MMHG: 14

## 2025-03-20 ASSESSMENT — CONF VISUAL FIELD
OS_NORMAL: 1
OD_INFERIOR_NASAL_RESTRICTION: 0
OS_INFERIOR_NASAL_RESTRICTION: 0
OD_SUPERIOR_TEMPORAL_RESTRICTION: 0
METHOD: COUNTING FINGERS
OS_SUPERIOR_TEMPORAL_RESTRICTION: 0
OD_NORMAL: 1
OS_INFERIOR_TEMPORAL_RESTRICTION: 0
OD_SUPERIOR_NASAL_RESTRICTION: 0
OS_SUPERIOR_NASAL_RESTRICTION: 0
OD_INFERIOR_TEMPORAL_RESTRICTION: 0

## 2025-03-20 ASSESSMENT — ENCOUNTER SYMPTOMS
GASTROINTESTINAL NEGATIVE: 0
HEMATOLOGIC/LYMPHATIC NEGATIVE: 0
ENDOCRINE NEGATIVE: 0
MUSCULOSKELETAL NEGATIVE: 0
CONSTITUTIONAL NEGATIVE: 0
CARDIOVASCULAR NEGATIVE: 0
ALLERGIC/IMMUNOLOGIC NEGATIVE: 0
EYES NEGATIVE: 1
RESPIRATORY NEGATIVE: 0
PSYCHIATRIC NEGATIVE: 0
NEUROLOGICAL NEGATIVE: 0

## 2025-03-20 ASSESSMENT — VISUAL ACUITY
OS_CC: 20/25
OD_CC+: -2
CORRECTION_TYPE: CONTACTS
OD_CC: 20/20
METHOD: SNELLEN - LINEAR

## 2025-03-20 ASSESSMENT — SLIT LAMP EXAM - LIDS
COMMENTS: GOOD POSITION
COMMENTS: GOOD POSITION

## 2025-03-20 ASSESSMENT — CUP TO DISC RATIO
OS_RATIO: .6
OD_RATIO: 0.95

## 2025-03-20 ASSESSMENT — EXTERNAL EXAM - LEFT EYE: OS_EXAM: NORMAL

## 2025-03-20 ASSESSMENT — PACHYMETRY
OD_CT(UM): 578
OS_CT(UM): 528

## 2025-03-20 ASSESSMENT — EXTERNAL EXAM - RIGHT EYE: OD_EXAM: NORMAL

## 2025-03-20 NOTE — PROGRESS NOTES
Visual Acuity (Snellen - Linear)         Right Left    Dist cc 20/20 -2 20/25      Correction: Contacts          Tonometry       Tonometry (Goldmann Applanation, 8:13 AM)         Right Left    Pressure 14 14                  Assessment/Plan   Last dilated:  1/23/25  Last gonio OD:  D40r 1-2+ PTM    OS:  D40r 1-2+ PTM    1.  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 Pt has switched glaucoma medications that is covered by his insurance. s/p SLT OD 6/28/16 pre-laser IOP 15 mmHg -> + effect.  s/p repeat SLT OD 4/20/17 (pre-laser IOP 18 mmHg) -> + effect.       There may be some progression of RNFL OS but that could have been vitreous traction artifact.  VF is technically same as 2016, but not as good as previous.  Course unclear - given that IOP is already very low, will not advance Rx, but will increase surveillance.  VF Q6 months.        s/p SLT OD (RIGHT) 12/16/24, pre-laser = 18 mmHg -> 3 mmHg effect, but IOP has risen again - minimal change in RNFL despite period of poorer IOP control.  With addition of rhopressa OD, IOP is now well controlled.       Plan: cont latanoprost OU QHS              cont dorzolamide / timolol OU BID             cont brimonidine 0.2% OU BID             cont rhopressa OD QHS             f/u 3 months     2.   H/o LASIK OS: pt`s [facility] is off.       Plan:  monitor     3.  Post Polar Cataracts OU:  minimally visually significant.  Polar cataract both eyes       Plan:  monitor    4.  Flashes OS:  pt noting occassional flash daily for the last month,ie. Nov 2024.  Pt was seen urgently and then subsequently non-urgently with Dr. Moffett at [facility] 11/27/24        Plan:  as per Dr. Proctor     4.  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

## 2025-03-27 ENCOUNTER — APPOINTMENT (OUTPATIENT)
Dept: OPHTHALMOLOGY | Facility: CLINIC | Age: 60
End: 2025-03-27
Payer: COMMERCIAL

## 2025-04-17 ENCOUNTER — APPOINTMENT (OUTPATIENT)
Dept: OPHTHALMOLOGY | Facility: CLINIC | Age: 60
End: 2025-04-17
Payer: COMMERCIAL

## 2025-06-05 ENCOUNTER — APPOINTMENT (OUTPATIENT)
Dept: OPHTHALMOLOGY | Facility: CLINIC | Age: 60
End: 2025-06-05
Payer: COMMERCIAL

## 2025-06-05 DIAGNOSIS — H40.1321 PIGMENTARY GLAUCOMA, LEFT EYE, MILD STAGE: ICD-10-CM

## 2025-06-05 DIAGNOSIS — H25.041 POSTERIOR SUBCAPSULAR POLAR SENILE CATARACT, RIGHT: ICD-10-CM

## 2025-06-05 DIAGNOSIS — H40.1313 PIGMENTARY GLAUCOMA, RIGHT EYE, SEVERE STAGE: Primary | ICD-10-CM

## 2025-06-05 PROCEDURE — 99213 OFFICE O/P EST LOW 20 MIN: CPT | Performed by: OPHTHALMOLOGY

## 2025-06-05 ASSESSMENT — ENCOUNTER SYMPTOMS
ALLERGIC/IMMUNOLOGIC NEGATIVE: 0
RESPIRATORY NEGATIVE: 0
EYES NEGATIVE: 1
CARDIOVASCULAR NEGATIVE: 0
MUSCULOSKELETAL NEGATIVE: 0
CONSTITUTIONAL NEGATIVE: 0
GASTROINTESTINAL NEGATIVE: 0
HEMATOLOGIC/LYMPHATIC NEGATIVE: 0
NEUROLOGICAL NEGATIVE: 0
ENDOCRINE NEGATIVE: 0
PSYCHIATRIC NEGATIVE: 0

## 2025-06-05 ASSESSMENT — EXTERNAL EXAM - LEFT EYE: OS_EXAM: NORMAL

## 2025-06-05 ASSESSMENT — PACHYMETRY
OS_CT(UM): 528
OD_CT(UM): 578

## 2025-06-05 ASSESSMENT — EXTERNAL EXAM - RIGHT EYE: OD_EXAM: NORMAL

## 2025-06-05 ASSESSMENT — VISUAL ACUITY
METHOD: SNELLEN - LINEAR
CORRECTION_TYPE: CONTACTS
OD_CC: 20/25
OS_CC: 20/25

## 2025-06-05 ASSESSMENT — CUP TO DISC RATIO
OS_RATIO: .6
OD_RATIO: 0.95

## 2025-06-05 ASSESSMENT — TONOMETRY
IOP_METHOD: GOLDMANN APPLANATION
OD_IOP_MMHG: 14
OS_IOP_MMHG: 15

## 2025-06-05 ASSESSMENT — SLIT LAMP EXAM - LIDS
COMMENTS: GOOD POSITION
COMMENTS: GOOD POSITION

## 2025-06-05 NOTE — PROGRESS NOTES
Visual Acuity (Snellen - Linear)         Right Left    Dist cc 20/25 20/25      Correction: Contacts          Tonometry       Tonometry (Goldmann Applanation, 8:02 AM)         Right Left    Pressure 14 15                  Assessment/Plan   Last dilated:  1/23/25  Last gonio OD:  D40r 1-2+ PTM    OS:  D40r 1-2+ PTM    1.  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 Pt has switched glaucoma medications that is covered by his insurance. s/p SLT OD 6/28/16 pre-laser IOP 15 mmHg -> + effect.  s/p repeat SLT OD 4/20/17 (pre-laser IOP 18 mmHg) -> + effect.       There may be some progression of RNFL OS but that could have been vitreous traction artifact.  VF is technically same as 2016, but not as good as previous.  Course unclear - given that IOP is already very low, will not advance Rx, but will increase surveillance.  VF Q6 months.        s/p SLT OD (RIGHT) 12/16/24, pre-laser = 18 mmHg -> 3 mmHg effect, but IOP has risen again - minimal change in RNFL despite period of poorer IOP control.  With addition of rhopressa OD, IOP is now well controlled.       Plan: cont latanoprost OU QHS              cont dorzolamide / timolol OU BID             cont brimonidine 0.2% OU BID             cont rhopressa OD QHS             f/u 3 months     2.   H/o LASIK OS: pt`s [facility] is off.       Plan:  monitor     3.  Post Polar Cataracts OU:  minimally visually significant.  Polar cataract both eyes       Plan:  monitor    4.  Flashes OS:  pt noting occassional flash daily for the last month,ie. Nov 2024.  Pt was seen urgently and then subsequently non-urgently with Dr. Moffett at [facility] 11/27/24        Plan:  as per Dr. Proctor     4.  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

## 2025-09-11 ENCOUNTER — APPOINTMENT (OUTPATIENT)
Dept: OPHTHALMOLOGY | Facility: CLINIC | Age: 60
End: 2025-09-11
Payer: COMMERCIAL